# Patient Record
Sex: FEMALE | URBAN - METROPOLITAN AREA
[De-identification: names, ages, dates, MRNs, and addresses within clinical notes are randomized per-mention and may not be internally consistent; named-entity substitution may affect disease eponyms.]

---

## 2021-12-29 ENCOUNTER — OFFICE VISIT (OUTPATIENT)
Dept: URBAN - METROPOLITAN AREA CLINIC 56 | Facility: CLINIC | Age: 61
End: 2021-12-29
Payer: COMMERCIAL

## 2021-12-29 DIAGNOSIS — I63.9 STROKE: Primary | ICD-10-CM

## 2021-12-29 DIAGNOSIS — H52.4 PRESBYOPIA: ICD-10-CM

## 2021-12-29 DIAGNOSIS — R73.03 PREDIABETES: ICD-10-CM

## 2021-12-29 DIAGNOSIS — H43.391 OTHER VITREOUS OPACITIES, RIGHT EYE: ICD-10-CM

## 2021-12-29 DIAGNOSIS — H25.13 AGE-RELATED NUCLEAR CATARACT, BILATERAL: ICD-10-CM

## 2021-12-29 PROCEDURE — 92133 CPTRZD OPH DX IMG PST SGM ON: CPT | Performed by: OPTOMETRIST

## 2021-12-29 PROCEDURE — 99204 OFFICE O/P NEW MOD 45 MIN: CPT | Performed by: OPTOMETRIST

## 2021-12-29 PROCEDURE — 92134 CPTRZ OPH DX IMG PST SGM RTA: CPT | Performed by: OPTOMETRIST

## 2021-12-29 PROCEDURE — 92250 FUNDUS PHOTOGRAPHY W/I&R: CPT | Performed by: OPTOMETRIST

## 2021-12-29 PROCEDURE — 92083 EXTENDED VISUAL FIELD XM: CPT | Performed by: OPTOMETRIST

## 2021-12-29 ASSESSMENT — INTRAOCULAR PRESSURE
OS: 12
OD: 12

## 2021-12-29 ASSESSMENT — VISUAL ACUITY
OS: 20/20
OD: 20/20

## 2021-12-29 ASSESSMENT — KERATOMETRY
OS: 44.16
OD: 44.26

## 2021-12-29 NOTE — IMPRESSION/PLAN
Impression: Other vitreous opacities, right eye: H43.391. Plan: The patient was instructed to monitor symptoms closely and to observe for an increase in amount of severity of flashes and floaters, or the development of new curtains in the periphery of vision. Explained that the symptoms of vitreous degeneration/detachment generally subsides but they can take many months to do so. All signs and risks of retinal detachment or tears were discussed in detail. If patient notices any symptoms discussed, contact office ASAP. Recommend patient return for normal recall.

## 2021-12-29 NOTE — IMPRESSION/PLAN
Impression: Prediabetes: R73.03. Plan: Discussed with the patient my findings. No diabetic retinopathy on today's exam.  Patient encourage to monitor blood glucose and advised to call office if notes any sudden changes in vision. Patient informed the importance for regular diabetic exams.

## 2021-12-29 NOTE — IMPRESSION/PLAN
Impression: Stroke: I63.9. 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 A comprehensive examination reveals acuity 20/20 correctable in both eyes, normal pupillary reflexes, full ocular motility, no ptosis and clear optical media. A dialated eye exam reveals normal appearing optic discs, retinal vasculature in both eyes. An OCT optic disc scan revealed normal thickness and morphology in both eyes and a threshold visual field revelas full fields in both eyes with no defect consistent with a posterior chiasmal or optic tract defect consistent with a lesion in the parietal lobe area.  
Recommend an annual comprehensive exam with same day HVF 30-2

## 2024-09-23 ENCOUNTER — OFFICE VISIT (OUTPATIENT)
Dept: URBAN - METROPOLITAN AREA CLINIC 56 | Facility: LOCATION | Age: 64
End: 2024-09-23
Payer: COMMERCIAL

## 2024-09-23 DIAGNOSIS — H52.4 PRESBYOPIA: ICD-10-CM

## 2024-09-23 DIAGNOSIS — H25.13 AGE-RELATED NUCLEAR CATARACT, BILATERAL: Primary | ICD-10-CM

## 2024-09-23 PROCEDURE — 92014 COMPRE OPH EXAM EST PT 1/>: CPT | Performed by: OPTOMETRIST

## 2024-09-23 ASSESSMENT — INTRAOCULAR PRESSURE
OD: 12
OS: 12

## 2024-09-23 ASSESSMENT — KERATOMETRY
OD: 44.23
OS: 44.27

## 2024-09-23 ASSESSMENT — VISUAL ACUITY
OD: 20/20
OS: 20/20